# Patient Record
(demographics unavailable — no encounter records)

---

## 2024-11-27 NOTE — PHYSICAL EXAM
[Erythematous Oropharynx] : nonerythematous oropharynx [Enlarged Tonsils] : tonsils not enlarged [Vesicles] : no vesicles [Exudate] : no exudate [NL] : warm, clear [de-identified] : 2-3 mm mild erythematous ulcer bilateral lower gum/cheek border and x1 under central tongue, no swelling

## 2024-11-27 NOTE — DISCUSSION/SUMMARY
[FreeTextEntry1] : Here for 6 days of presumed aphthous ulcer pain. No red flags for bacterial superinfection. Reassuring no systemic symptoms and maintaining hydration. Ulcers are not vesicular in nature, low concern for herpes, but if recurs, please present day 1-2 so we can swab.   - Continue chlorohexadine to help prevent infection - Can trial lidocaine. Reviewed risk of overdose and serious side effects that can be life threatening. Please take only as prescribed - Reviewed will forgo liquid dexamethasone as swish and spit can be difficult (5 minutes per treatment) and most effective in first few days - Saw orthodontist yesterday, referred here and to peridontist, reviewed if no improvement in 1 week, please follow up with peridontist - If he develops fever, severe pain, trouble swallowing, or facial swelling please go to the emergency room   During today's visit, services included coordinating care, counseling and education patient/family/caregiver, documenting clinical information in the electronic health record, reviewed medical records and data, total time spent on the E/M service before/during/after visit on date of service: 30 minutes

## 2024-11-27 NOTE — HISTORY OF PRESENT ILLNESS
[FreeTextEntry6] : Here with Mom for UC follow up  Seen in UC 11/25, diagnosed with aphthous ulcers, prescribed chlorohexidine wash  Pain began friday, today is day 6 Reports pain is the same, not improving, not taking pain medications No fevers, sore throat, trouble swallowing, discharge, bad mouth odor, neck pain, runny nose/cough, vomiting/diarrhea, rashes  Normal UOP Normal voice No prior history of ulcers

## 2024-12-11 NOTE — RISK ASSESSMENT
[1] : 1) Little interest or pleasure doing things for several days (1) [0] : 2) Feeling down, depressed, or hopeless: Not at all (0) [PHQ-2 Negative - No further assessment needed] : PHQ-2 Negative - No further assessment needed [No Increased risk of SCA or SCD] : No Increased risk of SCA or SCD    [[id number]] : 1 [Have you ever fainted, passed out or had an unexplained seizure suddenly and without warning, especially during exercise or in response] : Have you ever fainted, passed out or had an unexplained seizure suddenly and without warning, especially during exercise or in response to sudden loud noises such as doorbells, alarm clocks and ringing telephones? No [Have you ever had exercise-related chest pain or shortness of breath?] : Have you ever had exercise-related chest pain or shortness of breath? No [Has anyone in your immediate family (parents, grandparents, siblings) or other more distant relatives (aunts, uncles, cousins)  of heart] : Has anyone in your immediate family (parents, grandparents, siblings) or other more distant relatives (aunts, uncles, cousins)  of heart problems or had an unexpected sudden death before age 50 (This would include unexpected drownings, unexplained car accidents in which the relative was driving or sudden infant death syndrome.)? No [Are you related to anyone with hypertrophic cardiomyopathy or hypertrophic obstructive cardiomyopathy, Marfan syndrome, arrhythmogenic] : Are you related to anyone with hypertrophic cardiomyopathy or hypertrophic obstructive cardiomyopathy, Marfan syndrome, arrhythmogenic right ventricular cardiomyopathy, long QT syndrome, short QT syndrome, Brugada syndrome or catecholaminergic polymorphic ventricular tachycardia, or anyone younger than 50 years with a pacemaker or implantable defibrillator? No

## 2024-12-11 NOTE — HISTORY OF PRESENT ILLNESS
[FreeTextEntry1] : Here with Mom for annual visit No parental concerns No ER visits in the last year No subspecialty visits in the last year Recent aphthous ulcers resolved  History of anemia and high TG in 2022 Off iron Will recheck today  Lost weight in last 2-3 years Per Mom, gained alot of weight in pandemic, has been slowly try to lose it Feels he eats normal amount of food compared to brother, no meal skipping/food aversion Sugary drinks <2 Milk servings daily 2 Vegetable intake Yes Fruit intake Yes Eats 3 meals daily Yes Snacks daily 2 Skips meals: No Positive body image: Yes   Elimination: Normal, no constipation   Sleep: Normal pattern   Behavior concerns: No concerns Mood concerns: No concerns   Activity: Minutes active daily 60   School: IEP No Performance Normal Parent/teacher concerns None   Screen time amount <2 hours Screen time monitored Yes Screen time plan discussed Yes   Environment: Smoke exposure No Firearms in the home No Smoke/CO detectors Yes Wears seatbelt Yes   Dental home Yes Last visit Within last 1 year Brushes twice daily Yes Source of fluoride Toothpaste  Adolescent confidentially discussed with patient/parent: Yes   Home: Has positive relationship with parent(s): yes Comfortable asking parents(s)/family for help: yes   Education: Has positive attitude toward school: yes Is involved in school activities: yes Good academic achievement: yes   Activities: Is involved in group activities: yes Is engaged in risky and/or harmful activities: no   Drugs: Using drugs: no Using cannabis: no Using alcohol: no Using tobacco: no   Sexuality: Previously sexually active: no Currently sexually active: no   Suicide/Depression/Self-Image: Has positive self-esteem/self-image: yes Has anxiety: no Has depression: no Has suicidal ideation: no   Safety: Feels safe at home: yes Feels safe at school: yes  CORY 7 normal CRAFTT normal

## 2024-12-11 NOTE — DISCUSSION/SUMMARY
[FreeTextEntry1] : L. deltoid: Fluzone         Pt tolerated well.    [] : The components of the vaccine(s) to be administered today are listed in the plan of care. The disease(s) for which the vaccine(s) are intended to prevent and the risks have been discussed with the caretaker.  The risks are also included in the appropriate vaccination information statements which have been provided to the patient's caregiver.  The caregiver has given consent to vaccinate.

## 2024-12-11 NOTE — PHYSICAL EXAM
[Alert] : alert [No Acute Distress] : no acute distress [Normocephalic] : normocephalic [EOMI Bilateral] : EOMI bilateral [Clear tympanic membranes with bony landmarks and light reflex present bilaterally] : clear tympanic membranes with bony landmarks and light reflex present bilaterally  [Pink Nasal Mucosa] : pink nasal mucosa [Nonerythematous Oropharynx] : nonerythematous oropharynx [Supple, full passive range of motion] : supple, full passive range of motion [No Palpable Masses] : no palpable masses [Clear to Auscultation Bilaterally] : clear to auscultation bilaterally [Regular Rate and Rhythm] : regular rate and rhythm [Normal S1, S2 audible] : normal S1, S2 audible [No Murmurs] : no murmurs [+2 Femoral Pulses] : +2 femoral pulses [Soft] : soft [NonTender] : non tender [Non Distended] : non distended [Normoactive Bowel Sounds] : normoactive bowel sounds [No Hepatomegaly] : no hepatomegaly [No Splenomegaly] : no splenomegaly [Cuco: _____] : Cuco [unfilled] [Bilateral descended testes] : bilateral descended testes [No Abnormal Lymph Nodes Palpated] : no abnormal lymph nodes palpated [Normal Muscle Tone] : normal muscle tone [No Gait Asymmetry] : no gait asymmetry [No pain or deformities with palpation of bone, muscles, joints] : no pain or deformities with palpation of bone, muscles, joints [Straight] : straight [+2 Patella DTR] : +2 patella DTR [Cranial Nerves Grossly Intact] : cranial nerves grossly intact [No Rash or Lesions] : no rash or lesions [FreeTextEntry6] : Purpose of genital exam reviewed and chaperone policy discussed. Chaperone during today's visit: Mom

## 2024-12-11 NOTE — DISCUSSION/SUMMARY
[FreeTextEntry1] : Healthy diet, normal exam, doing well in school, no parental concerns.  - Vital signs reviewed and normal - Reviewed with child during private discussion, anticipatory guidance regarding HEADSS assessment topics, and pertinent positive screens as applicable - Routine age appropriate bright futures topics discussed, including but not limited to the topics below - Provided positive reinforcement for healthy lifestyle, including encouraging physical activity, having a well rounded diet, limiting sugary snacks/drinks, and have a routine sleep schedule/getting at least 8 hours of sleep daily - Brush teeth twice daily and see dentist at least once yearly - Continue to work hard in school! Think about future plans and discuss any questions or concerns with your parents or school career advisors - Focus on healthy relationships with your peers and identify those who are a bad influence. Parents should get to know child's friends and encourage healthy relationships, avoid dangerous situations, and promote safety - Review safety with your child as they become more independent, such as knowing their home address and parents phone numbers, and who to call in case of an emergency - Getting a child their own phone is an individual family decision, however consider screen time limits and installing parental controls (you can reach out to your phone provider to learn how to do this) - AAP Bright Futures handout provided today - Follow up for next physical in 1 year   Discussed appropriate vaccines today, benefits / risks, and expected common side effects. All questions answered. VIS provided today. Call office for fever that lasts longer than 24 hours or for any parental concerns. Up to date vaccine record provided today.   Return to office if oral ulcers return for herpes testing